# Patient Record
Sex: FEMALE | Race: WHITE | URBAN - METROPOLITAN AREA
[De-identification: names, ages, dates, MRNs, and addresses within clinical notes are randomized per-mention and may not be internally consistent; named-entity substitution may affect disease eponyms.]

---

## 2020-11-05 ENCOUNTER — APPOINTMENT (RX ONLY)
Dept: URBAN - METROPOLITAN AREA CLINIC 27 | Facility: CLINIC | Age: 85
Setting detail: DERMATOLOGY
End: 2020-11-05

## 2020-11-05 DIAGNOSIS — L82.0 INFLAMED SEBORRHEIC KERATOSIS: ICD-10-CM

## 2020-11-05 DIAGNOSIS — D22 MELANOCYTIC NEVI: ICD-10-CM

## 2020-11-05 DIAGNOSIS — D18.0 HEMANGIOMA: ICD-10-CM

## 2020-11-05 DIAGNOSIS — L81.4 OTHER MELANIN HYPERPIGMENTATION: ICD-10-CM

## 2020-11-05 DIAGNOSIS — L57.8 OTHER SKIN CHANGES DUE TO CHRONIC EXPOSURE TO NONIONIZING RADIATION: ICD-10-CM

## 2020-11-05 DIAGNOSIS — L82.1 OTHER SEBORRHEIC KERATOSIS: ICD-10-CM

## 2020-11-05 PROBLEM — D18.01 HEMANGIOMA OF SKIN AND SUBCUTANEOUS TISSUE: Status: ACTIVE | Noted: 2020-11-05

## 2020-11-05 PROBLEM — D22.5 MELANOCYTIC NEVI OF TRUNK: Status: ACTIVE | Noted: 2020-11-05

## 2020-11-05 PROCEDURE — 17110 DESTRUCTION B9 LES UP TO 14: CPT

## 2020-11-05 PROCEDURE — ? FULL BODY SKIN EXAM

## 2020-11-05 PROCEDURE — 99203 OFFICE O/P NEW LOW 30 MIN: CPT | Mod: 25

## 2020-11-05 PROCEDURE — ? COUNSELING

## 2020-11-05 PROCEDURE — ? LIQUID NITROGEN

## 2020-11-05 PROCEDURE — ? SUNSCREEN RECOMMENDATIONS

## 2020-11-05 PROCEDURE — ? ADDITIONAL NOTES

## 2020-11-05 ASSESSMENT — LOCATION DETAILED DESCRIPTION DERM
LOCATION DETAILED: LEFT SUPERIOR MEDIAL UPPER BACK
LOCATION DETAILED: RIGHT MEDIAL BREAST 3-4:00 REGION
LOCATION DETAILED: LEFT MEDIAL BREAST 10-11:00 REGION
LOCATION DETAILED: MIDDLE STERNUM
LOCATION DETAILED: LEFT INFERIOR MEDIAL UPPER BACK
LOCATION DETAILED: LEFT INFERIOR MEDIAL MIDBACK
LOCATION DETAILED: INFERIOR THORACIC SPINE
LOCATION DETAILED: RIGHT INFERIOR MEDIAL MIDBACK
LOCATION DETAILED: LEFT MEDIAL BREAST 9-10:00 REGION

## 2020-11-05 ASSESSMENT — LOCATION ZONE DERM: LOCATION ZONE: TRUNK

## 2020-11-05 ASSESSMENT — LOCATION SIMPLE DESCRIPTION DERM
LOCATION SIMPLE: LEFT UPPER BACK
LOCATION SIMPLE: LEFT LOWER BACK
LOCATION SIMPLE: UPPER BACK
LOCATION SIMPLE: CHEST
LOCATION SIMPLE: LEFT BREAST
LOCATION SIMPLE: RIGHT BREAST
LOCATION SIMPLE: RIGHT LOWER BACK

## 2020-11-05 NOTE — PROCEDURE: LIQUID NITROGEN
Include Z78.9 (Other Specified Conditions Influencing Health Status) As An Associated Diagnosis?: No
Detail Level: Detailed
Medical Necessity Clause: This procedure was medically necessary because the lesions that were treated were:
Consent: The patient's consent was obtained including but not limited to risks of crusting, scabbing, blistering, scarring, darker or lighter pigmentary change, recurrence, incomplete removal and infection.
Render Post-Care Instructions In Note?: yes
Post-Care Instructions: I reviewed with the patient in detail post-care instructions. Patient is to wear sunprotection, and avoid picking at any of the treated lesions. Pt may apply Vaseline to crusted or scabbing areas.
Medical Necessity Information: It is in your best interest to select a reason for this procedure from the list below. All of these items fulfill various CMS LCD requirements except the new and changing color options.

## 2023-05-01 ENCOUNTER — TELEPHONE (OUTPATIENT)
Dept: CARDIOTHORACIC SURGERY | Facility: CLINIC | Age: 88
End: 2023-05-01
Payer: COMMERCIAL

## 2023-05-01 NOTE — TELEPHONE ENCOUNTER
Please call patients daughterChetna, # is [phone] and complete the necessary steps to set up with care everywhere so we can obtain recent records from [facility].  Please request from [facility] on disc the most recent TTE, BEVERLY, CTA or MRI.  This patient will be coming to see dr. garcia for mitral valve disease. Thanks

## 2023-05-03 NOTE — TELEPHONE ENCOUNTER
Pt's daughter Chetna returned call said she needs clarification as to what is needed exactly for pt to be set up appt     P:    [phone]

## 2023-05-05 ENCOUNTER — TELEPHONE (OUTPATIENT)
Dept: CARDIOTHORACIC SURGERY | Facility: CLINIC | Age: 88
End: 2023-05-05
Payer: COMMERCIAL

## 2023-05-05 NOTE — TELEPHONE ENCOUNTER
Pt's daughter Chetna returned call said she needs clarification as to what is needed exactly for pt to be set up appt. Chetna states pt has been out of rhythm for a while, pt went back into rhythm on Tuesday after pt started taking amiodarone 200mg once a day     Chetna can be reached at  [phone]

## 2023-05-05 NOTE — TELEPHONE ENCOUNTER
Spoke to kate, she said she gave insurance info and other info today to someone earlier today however I need to be able to utilize care everywhere. Can you please help kate figure out how to enroll her mom into care everywhere.

## 2023-05-05 NOTE — TELEPHONE ENCOUNTER
Hey you sent a disc request on 5/2, will you let me know when we receive it please .lety goncalves

## 2023-05-09 NOTE — TELEPHONE ENCOUNTER
Sejal called stating she is sending records to Dr Tesfaye through [facility].     Please keep an eye open for them. ty

## 2023-05-16 NOTE — TELEPHONE ENCOUNTER
Danika from [facility] called.    They use [employer] not [employer].     She will send echo dated  12/2022 and BEVERLY dated 3/14 out today.    Danika can be reached at     [phone] if needed. ty

## 2023-05-23 ENCOUNTER — TELEPHONE (OUTPATIENT)
Dept: SCHEDULING | Facility: CLINIC | Age: 88
End: 2023-05-23
Payer: COMMERCIAL

## 2023-05-23 NOTE — TELEPHONE ENCOUNTER
Pt daughter calling back in regards to previus messages    Would like to know if the disc have been received and reviewed    Also still trying to schedule appt with , please assist     Chetna can be reached at [phone]

## 2023-06-16 NOTE — TELEPHONE ENCOUNTER
This pt is scheduled for 6/22/2023 in our office. Can we request copies of her discs from [facility]? Thank You!

## 2023-06-20 ENCOUNTER — TELEPHONE (OUTPATIENT)
Dept: CARDIOTHORACIC SURGERY | Facility: CLINIC | Age: 88
End: 2023-06-20

## 2023-06-20 NOTE — TELEPHONE ENCOUNTER
Hi Crystal~ I know we requested the discs from [facility] but I don't see them in PACS. Can we put in another request? May need a !

## 2023-06-22 ENCOUNTER — HOSPITAL ENCOUNTER (OUTPATIENT)
Dept: CARDIOLOGY | Facility: HOSPITAL | Age: 88
Discharge: HOME | End: 2023-06-22
Attending: STUDENT IN AN ORGANIZED HEALTH CARE EDUCATION/TRAINING PROGRAM
Payer: COMMERCIAL

## 2023-06-22 ENCOUNTER — OFFICE VISIT (OUTPATIENT)
Dept: CARDIOTHORACIC SURGERY | Facility: CLINIC | Age: 88
End: 2023-06-22
Payer: COMMERCIAL

## 2023-06-22 VITALS
BODY MASS INDEX: 25.03 KG/M2 | DIASTOLIC BLOOD PRESSURE: 74 MMHG | RESPIRATION RATE: 16 BRPM | HEIGHT: 60 IN | HEART RATE: 47 BPM | OXYGEN SATURATION: 92 % | SYSTOLIC BLOOD PRESSURE: 126 MMHG | WEIGHT: 127.5 LBS | TEMPERATURE: 97.6 F

## 2023-06-22 VITALS
OXYGEN SATURATION: 92 % | RESPIRATION RATE: 16 BRPM | HEIGHT: 60 IN | DIASTOLIC BLOOD PRESSURE: 65 MMHG | BODY MASS INDEX: 24.94 KG/M2 | SYSTOLIC BLOOD PRESSURE: 121 MMHG | WEIGHT: 127 LBS | HEART RATE: 55 BPM

## 2023-06-22 DIAGNOSIS — I34.0 NONRHEUMATIC MITRAL VALVE REGURGITATION: Primary | ICD-10-CM

## 2023-06-22 DIAGNOSIS — I34.0 NONRHEUMATIC MITRAL VALVE REGURGITATION: ICD-10-CM

## 2023-06-22 DIAGNOSIS — I25.10 CORONARY ARTERY DISEASE, UNSPECIFIED VESSEL OR LESION TYPE, UNSPECIFIED WHETHER ANGINA PRESENT, UNSPECIFIED WHETHER NATIVE OR TRANSPLANTED HEART: ICD-10-CM

## 2023-06-22 DIAGNOSIS — I50.20 HFREF (HEART FAILURE WITH REDUCED EJECTION FRACTION) (CMS/HCC): ICD-10-CM

## 2023-06-22 LAB
AORTIC ROOT ANNULUS - M-MODE: 2.8 CM
AORTIC ROOT ANNULUS: 2.9 CM
AORTIC VALVE MEAN VELOCITY: 1.07 M/S
AORTIC VALVE VELOCITY TIME INTEGRAL: 40 CM
ASCENDING AORTA: 4.5 CM
AV MEAN GRADIENT: 5 MMHG
AV PEAK GRADIENT: 9 MMHG
AV PEAK VELOCITY-S: 1.54 M/S
AV REG PEAK VEL: 4.24 M/S
AV REGURGITATION PRESSURE HALF TIME: 514 MS
AV VALVE AREA INDEX: 2.12
AV VALVE AREA: 2.69 CM2
AV VELOCITY RATIO: 0.8
AVA (VTI): 3.3 CM2
BSA FOR ECHO PROCEDURE: 1.56 M2
CUSP SEPARATION: 1.8 CM
DOP CALC LVOT STROKE VOLUME: 132.05 CM3
E WAVE DECELERATION TIME: 190 MS
E/A RATIO: 0.8
E/E' RATIO: 11.4
E/LAT E' RATIO: 11.1
EF (A4C): 34.5 %
FRACTIONAL SHORTENING: 15.74 %
HEART RATE: 55 BPM
INTERVENTRICULAR SEPTUM: 0.82 CM
LA ESV (BP): 132 CM3
LA ESV INDEX (A2C): 80.13 CM3/M2
LA ESV INDEX (BP): 84.62 CM3/M2
LA/AORTA RATIO: 1.9
LAAS-AP2: 32.7 CM2
LAAS-AP4: 31.9 CM2
LAD 2D: 5.5 CM
LALD A4C: 6.43 CM
LALD A4C: 7.13 CM
LAV-S: 125 CM3
LEFT ATRIUM VOLUME INDEX: 80.77 CM3/M2
LEFT ATRIUM VOLUME: 126 CM3
LEFT INTERNAL DIMENSION IN SYSTOLE: 4.39 CM (ref 2.26–3.42)
LEFT VENTRICLE DIASTOLIC VOLUME INDEX: 89.1 CM3/M2
LEFT VENTRICLE DIASTOLIC VOLUME: 139 CM3
LEFT VENTRICLE SYSTOLIC VOLUME INDEX: 58.33 CM3/M2
LEFT VENTRICLE SYSTOLIC VOLUME: 91 CM3
LEFT VENTRICULAR INTERNAL DIMENSION IN DIASTOLE: 5.21 CM (ref 3.8–5.27)
LEFT VENTRICULAR POSTERIOR WALL IN END DIASTOLE: 0.97 CM (ref 0.49–0.9)
LVAD-AP4: 38.6 CM2
LVAS-AP4: 28.6 CM2
LVLD-AP4: 8.69 CM
LVLS-AP4: 7.2 CM
LVOT 2D: 2.3 CM
LVOT A: 4.15 CM2
LVOT MG: 3 MMHG
LVOT MV: 0.81 M/S
LVOT PEAK VELOCITY: 1.27 M/S
LVOT PG: 6 MMHG
LVOT STROKE VOLUME INDEX: 84.65 ML/M2
LVOT VTI: 31.8 CM
MLH CV ECHO AVA INDEX VELOCITY RATIO: 1.7
MV E'TISSUE VEL-LAT: 0.04 M/S
MV E'TISSUE VEL-MED: 0.04 M/S
MV PEAK A VEL: 0.58 M/S
MV PEAK E VEL: 0.48 M/S
POSTERIOR WALL: 0.97 CM
PULM VEIN S/D RATIO: 1.6
PULMONARY REGURGITATION LATE DIASTOLIC VELOCITY: 1.31 M/S
PV PEAK D VEL: 0.35 M/S
PV PEAK S VEL: 0.55 M/S
RVOT VMAX: 0.59 M/S
RVOT VTI: 12.4 CM
SEPTAL TISSUE DOPPLER FREE WALL LATE DIA VELOCITY (APICAL 4 CHAMBER VIEW): 0.12 M/S
TAPSE: 2.07 CM
TR MAX PG: 45.16 MMHG
TRICUSPID VALVE PEAK REGURGITATION VELOCITY: 3.36 M/S
Z-SCORE OF LEFT VENTRICULAR DIMENSION IN END DIASTOLE: 1.52
Z-SCORE OF LEFT VENTRICULAR DIMENSION IN END SYSTOLE: 3.62
Z-SCORE OF LEFT VENTRICULAR POSTERIOR WALL IN END DIASTOLE: 1.99

## 2023-06-22 PROCEDURE — 25500000 HC DRUGS/INCIDENT RAD: Mod: JZ | Performed by: THORACIC SURGERY (CARDIOTHORACIC VASCULAR SURGERY)

## 2023-06-22 PROCEDURE — 99205 OFFICE O/P NEW HI 60 MIN: CPT | Performed by: THORACIC SURGERY (CARDIOTHORACIC VASCULAR SURGERY)

## 2023-06-22 PROCEDURE — C8929 TTE W OR WO FOL WCON,DOPPLER: HCPCS

## 2023-06-22 PROCEDURE — 93306 TTE W/DOPPLER COMPLETE: CPT | Mod: 26 | Performed by: INTERNAL MEDICINE

## 2023-06-22 PROCEDURE — 93000 ELECTROCARDIOGRAM COMPLETE: CPT | Performed by: THORACIC SURGERY (CARDIOTHORACIC VASCULAR SURGERY)

## 2023-06-22 PROCEDURE — 3008F BODY MASS INDEX DOCD: CPT | Performed by: THORACIC SURGERY (CARDIOTHORACIC VASCULAR SURGERY)

## 2023-06-22 PROCEDURE — 25000000 HC PHARMACY GENERAL: Performed by: THORACIC SURGERY (CARDIOTHORACIC VASCULAR SURGERY)

## 2023-06-22 RX ORDER — AMIODARONE HYDROCHLORIDE 200 MG/1
200 TABLET ORAL DAILY
COMMUNITY
Start: 2023-05-30

## 2023-06-22 RX ORDER — METOPROLOL TARTRATE 25 MG/1
25 TABLET, FILM COATED ORAL 2 TIMES DAILY
COMMUNITY
Start: 2023-04-11

## 2023-06-22 RX ORDER — UBIDECARENONE 100 MG
200 CAPSULE ORAL DAILY
COMMUNITY

## 2023-06-22 RX ORDER — ATORVASTATIN CALCIUM 20 MG/1
20 TABLET, FILM COATED ORAL DAILY
COMMUNITY
Start: 2023-05-05

## 2023-06-22 RX ORDER — LANOLIN ALCOHOL/MO/W.PET/CERES
400 CREAM (GRAM) TOPICAL DAILY
COMMUNITY

## 2023-06-22 RX ORDER — MULTIVITAMIN WITH IRON
TABLET ORAL SEE ADMIN INSTRUCTIONS
COMMUNITY

## 2023-06-22 RX ORDER — BUMETANIDE 2 MG/1
2 TABLET ORAL 2 TIMES DAILY
COMMUNITY
Start: 2023-05-26

## 2023-06-22 RX ORDER — RIVAROXABAN 20 MG/1
TABLET, FILM COATED ORAL
COMMUNITY
Start: 2023-05-19

## 2023-06-22 RX ADMIN — PERFLUTREN: 6.52 INJECTION, SUSPENSION INTRAVENOUS at 12:40

## 2023-06-22 ASSESSMENT — PATIENT HEALTH QUESTIONNAIRE - PHQ9: SUM OF ALL RESPONSES TO PHQ9 QUESTIONS 1 & 2: 0

## 2023-06-22 NOTE — PROGRESS NOTES
I, John Coyle MD, personally performed the services described in this documentation as scribed by Twyla Gonzalez PA-C in my presence, and it is both accurate and complete.    7/3/2023 12:13 PM    John Coyle MD, MS, FACS, [facility]  System Surgical Director,  Structural Heart Program  [facility]    Associate Professor of Surgery,  [facility]          [facility]          Dr. Leonides Gonzalez PA-C   [facility]                                          [phone]    Cate Lan presents today for 2nd opinion consultation of her mitral regurgitation. She is being referred by Dr. Hannon. She is accompanied today by her two daughters.     Patient is 88 y.o. female with a PMH of HTN, HLD, COPD on home O2, hepatic adenoma s/p resection, lung nodule, prior COVID with PE, IVCD, atrial fibrillation and severe mitral regurgitation.    Of note, she had 2 recent hospitalizations in past 3 months for acute HF with a concomitant COPD exacerbation. Given severe MR, she underwent workup for ROMAN therapy (mitraclip) and was deemed an appropriate candidate. She initially presented to [place] on 3/14/23 for a planned MitraClip procedure, however, preoperative BEVERLY and RHC was completed which revealed her anatomy not suitable to proceed with mitraclip therapy. Given no signs of volume overload (normal PCWP ) in light of known COPD, it was recommended she proceed with medical management and optimization of her GDMT.    At present, she continues to endorse shortness of breath at rest and with minimal activity. She remains of home O2. Her dyspnea is severely impacting her quality of life. She denies any weight gain, dizzinessm syncope, near syncope, CP, palpitations, or LE edema.     Primary Cardiologist: Dr. Hannon  PCP: Dr. Braxton    Medical History:   Past Medical History:   Diagnosis Date   • COPD (chronic obstructive  pulmonary disease) (CMS/HCC)    • Liver mass        Surgical History:   Past Surgical History:   Procedure Laterality Date   • LIVER SURGERY         Social History:   Social History     Social History Narrative   • Not on file       Family History: History reviewed. No pertinent family history.    Allergies: Patient has no known allergies.      Current Medications    Current Outpatient Medications:   •  amiodarone (PACERONE) 200 mg tablet, Take 200 mg by mouth daily., Disp: , Rfl:   •  atorvastatin (LIPITOR) 20 mg tablet, Take 20 mg by mouth daily., Disp: , Rfl:   •  bumetanide (BUMEX) 2 mg tablet, Take 2 mg by mouth 2 (two) times a day., Disp: , Rfl:   •  coenzyme Q10 (COQ10) 100 mg capsule, Take 200 mg by mouth daily., Disp: , Rfl:   •  magnesium oxide (MAG-OX) 400 mg (241.3 mg magnesium) tablet, Take 400 mg by mouth daily., Disp: , Rfl:   •  metoprolol tartrate (LOPRESSOR) 25 mg tablet, Take 25 mg by mouth 2 (two) times a day., Disp: , Rfl:   •  vit A and D3 in cod liver oiL 1,250-135 unit capsule, Take by mouth See admin instr., Disp: , Rfl:   •  VITAMIN B COMPLEX ORAL, Take 1 capsule by mouth daily., Disp: , Rfl:   •  XARELTO 20 mg tablet, take 1 tablet by mouth once daily WITH DINNER, Disp: , Rfl:      Review of Systems  Negative 10 point ROS besides what is noted in HPI.    Objective   Vitals:    06/22/23 0925   BP: 126/74   Pulse: (!) 47   Resp: 16   Temp: 36.4 °C (97.6 °F)   SpO2: 92%       Physical Exam  Constitutional:       Appearance: Normal appearance. She is normal weight.   HENT:      Head: Normocephalic and atraumatic.      Right Ear: External ear normal.      Left Ear: External ear normal.      Nose: Nose normal.      Mouth/Throat:      Mouth: Mucous membranes are moist.      Pharynx: Oropharynx is clear.   Eyes:      Extraocular Movements: Extraocular movements intact.      Conjunctiva/sclera: Conjunctivae normal.      Pupils: Pupils are equal, round, and reactive to light.   Neck:      Vascular:  No JVD.   Cardiovascular:      Rate and Rhythm: Normal rate and regular rhythm.      Pulses: Normal pulses.      Heart sounds: Murmur heard.      No friction rub. No gallop.   Pulmonary:      Breath sounds: No wheezing, rhonchi or rales.      Comments: On home oxygen  Musculoskeletal:      Right lower leg: No edema.      Left lower leg: No edema.   Skin:     General: Skin is warm and dry.      Capillary Refill: Capillary refill takes less than 2 seconds.   Neurological:      General: No focal deficit present.      Mental Status: She is alert and oriented to person, place, and time.   Psychiatric:         Mood and Affect: Mood normal.         Behavior: Behavior normal.         Labs  No results found for: WBC, HGB, HCT, MCV, PLT  No results found for: GLUCOSE, CALCIUM, NA, K, CO2, CL, BUN, CREATININE      Imaging  [id number]      TTE: 23  Interpretation Summary    Rhythm is sinus bradycardia. Technically difficult study. Echo contrast was used for endocardial enhancement.     1. Left ventricle is mildly dilated in size with normal wall thickness. Prominent basal septal hypertrophy.  Moderate-severely decreased systolic function, estimated ejection fraction 30%. Wall motion difficult to assess even with echo contrast, global hypokinesis more pronounced in the anteroseptum and septum.   2. Aortic valve is tricuspid. Aortic valve and root sclerosis.  No aortic stenosis.  Mild aortic regurgitation.   3. Aortic root is normal size. Visualized portion of ascending aorta is dilated and aneurysmal measuring 4.5 cm.  4.  Mitral valve leaflets incompletely visualized, there is marked thickening of myxomatous changes with prolapse of the anterior mitral valve leaflet.  Mitral annular calcification.  There is mild mitral regurgitation present  5. Left atrium is severely dilated.  6. Right ventricle is normal in size with preserved systolic function.   7. Right atrium is mildly dilated .   8. Mild to moderate tricuspid  regurgitation, estimated right ventricular systolic pressure 52 mmHg.  9. Mild pulmonic regurgitation.   10. Inferior vena cava is normal in size with >50% respiratory variation, consistent with estimated right atrial pressure of 3 mmHg.  11.  No pericardial effusion.     No prior echo for comparison.    BEVERLY: 3/14/23    BEVERLY 3/14 - preliminary report     Left Ventricle Normal left ventricular chamber size. Low normal left ventricular systolic function.   Left Atrium There is no thrombus present in the left atrium or in the left atrial appendage. Four pulmonary veins are seen entering the left atrium. Systolic flow reversal is present in the left superior pulmonary vein.   Mitral Valve There is severe prolapse of the anterior mitral valve leaflet. Posterior leaflet is normal in size laterally (1mm), but is small and diminuitive in size centrally and medially. Mitral regurgitatant jet occupies a large portion of mitral valves and extends from lateral to medial aspects of the mitral valve. No mitral stenosis. Severe mitral regurgitation.   Aortic Valve The aortic valve is trileaflet. Thickened aortic valve leaflets. Mild to moderate aortic regurgitation.   Right Ventricle Normal right ventricular function.   Tricuspid Valve Normal tricuspid valve. Moderate tricuspid regurgitation.   Pulmonic Valve Visualized segments of the pulmonic valve are normal. Mild pulmonic regurgitation.   Aorta There is focal grade 2 (severe intimal thickening) atheromatous plaque present in the aortic arch and descending aorta.   Pericardium Trivial pericardial effusion. The pericardial effusion is adjacent to the right atrium.     RHC: 3/14/23  RHC 3/14     Conclusion       • Preprocedure BEVERLY shows an absence of the posterior leaflet, therefore anatomy is not feasible for Mitraclip.  • RA= 11 mm Hg, RV= 35/11 mm Hg, PA= 35/16/23 mm Hg, PCWP= 16 mm Hg with V-waves to 22 mm Hg  • PA sat= 67%, AO sat= 100%, CO= 3.4, CI= 2.1, SVR= 1704, PVR=  2.1    Assessment:  #Aortic Aneurysm  Seen on recent TTE, max ascending dimension measuring 4.5cm    #Tricuspid Regurgitation  Normal RV size/function, mildly dilated RA, mild-moderate TR. RVSP 52mmHg     #HFpEF    #Mitral Regurgitation  Echo personally reviewed by John Coyle MD  Marked thickening with myxomatous changes with anterior mitral valve leaflet prolapse. Mild MR. LVEF 30% with RWMA more pronounced in the anteroseptum and septum. NYHA Class III  No history of syncopal or presyncopal episodes.    Patient has had recent hospitalization for CHF.  She has symptoms of MELENDEZ.  Medically managed Yes with bumex and lopressor.    Plan:  Her MR mild on today's TTE and is likely a dynamic process. The patient is euvolemic and there is no evidence of decompensated HF or need for intervention at this time. Her repeat TTE demonstrates a globally reduced LVEF 30% with RWMA more pronounced in the anteroseptum and septum. Advised to F/U with her primary cardiologist to have a LHC to r/o any underlying CAD and optimization of her GDMT. She will return to the clinic in 3 months with a repeat TTE.     Twyla TERAN PA-C am scribing and in the presence of  Dr. John Coyle.      John Coyle MD

## 2023-06-22 NOTE — PATIENT INSTRUCTIONS
Return to clinic in 3 months with repeat echo.    Please follow-up with Dr. Hannon to see if she needs a cardiac catheterization.    She would need cardiac clearance from Dr. Hannon before proceeding with any liver biopsies or resections.    We will request the BEVERLY disc from [facility] to review.     Call with any questions or concerns in the interim [phone]

## 2023-06-22 NOTE — LETTER
June 28, 2023     Karri Braxton, DO  [address]  [place] [id number]    Patient: Cate Lan  YOB: 1935  Date of Visit: 6/22/2023      Dear Dr. Braxton:    Thank you for referring Cate Lan to me for evaluation. Below are my notes for this consultation.    If you have questions, please do not hesitate to call me. I look forward to following your patient along with you.         Sincerely,        John Coyle MD        CC: MD Carlos Jordan Victoria Lynn, PA C  6/28/2023  2:34 PM  Sign when Signing Visit  [facility]          Dr. Leonides Gonzalez PA-C   [facility]                                          [phone]    Cate Lan presents today for 2nd opinion consultation of her mitral regurgitation. She is being referred by Dr. Hannon. She is accompanied today by her two daughters.     Patient is 88 y.o. female with a PMH of HTN, HLD, COPD on home O2, hepatic adenoma s/p resection, lung nodule, prior COVID with PE, IVCD, atrial fibrillation and severe mitral regurgitation.    Of note, she had 2 recent hospitalizations in past 3 months for acute HF with a concomitant COPD exacerbation. Given severe MR, she underwent workup for ROMAN therapy (mitraclip) and was deemed an appropriate candidate. She initially presented to [facility] on 3/14/23 for a planned MitraClip procedure, however, preoperative BEVERLY and RHC was completed which revealed her anatomy not suitable to proceed with mitraclip therapy. Given no signs of volume overload (normal PCWP ) in light of known COPD, it was recommended she proceed with medical management and optimization of her GDMT.    At present, she continues to endorse shortness of breath at rest and with minimal activity. She remains of home O2. Her dyspnea is severely impacting her quality of life. She denies any weight gain, dizzinessm syncope, near syncope, CP, palpitations, or LE edema.     Primary  Cardiologist: Dr. Hannon  PCP: Dr. Braxton    Medical History:   Past Medical History:   Diagnosis Date   • COPD (chronic obstructive pulmonary disease) (CMS/HCC)    • Liver mass        Surgical History:   Past Surgical History:   Procedure Laterality Date   • LIVER SURGERY         Social History:   Social History     Social History Narrative   • Not on file       Family History: History reviewed. No pertinent family history.    Allergies: Patient has no known allergies.      Current Medications    Current Outpatient Medications:   •  amiodarone (PACERONE) 200 mg tablet, Take 200 mg by mouth daily., Disp: , Rfl:   •  atorvastatin (LIPITOR) 20 mg tablet, Take 20 mg by mouth daily., Disp: , Rfl:   •  bumetanide (BUMEX) 2 mg tablet, Take 2 mg by mouth 2 (two) times a day., Disp: , Rfl:   •  coenzyme Q10 (COQ10) 100 mg capsule, Take 200 mg by mouth daily., Disp: , Rfl:   •  magnesium oxide (MAG-OX) 400 mg (241.3 mg magnesium) tablet, Take 400 mg by mouth daily., Disp: , Rfl:   •  metoprolol tartrate (LOPRESSOR) 25 mg tablet, Take 25 mg by mouth 2 (two) times a day., Disp: , Rfl:   •  vit A and D3 in cod liver oiL 1,250-135 unit capsule, Take by mouth See admin instr., Disp: , Rfl:   •  VITAMIN B COMPLEX ORAL, Take 1 capsule by mouth daily., Disp: , Rfl:   •  XARELTO 20 mg tablet, take 1 tablet by mouth once daily WITH DINNER, Disp: , Rfl:      Review of Systems  Negative 10 point ROS besides what is noted in HPI.    Objective   Vitals:    06/22/23 0925   BP: 126/74   Pulse: (!) 47   Resp: 16   Temp: 36.4 °C (97.6 °F)   SpO2: 92%       Physical Exam  Constitutional:       Appearance: Normal appearance. She is normal weight.   HENT:      Head: Normocephalic and atraumatic.      Right Ear: External ear normal.      Left Ear: External ear normal.      Nose: Nose normal.      Mouth/Throat:      Mouth: Mucous membranes are moist.      Pharynx: Oropharynx is clear.   Eyes:      Extraocular Movements: Extraocular movements  intact.      Conjunctiva/sclera: Conjunctivae normal.      Pupils: Pupils are equal, round, and reactive to light.   Neck:      Vascular: No JVD.   Cardiovascular:      Rate and Rhythm: Normal rate and regular rhythm.      Pulses: Normal pulses.      Heart sounds: Murmur heard.      No friction rub. No gallop.   Pulmonary:      Breath sounds: No wheezing, rhonchi or rales.      Comments: On home oxygen  Musculoskeletal:      Right lower leg: No edema.      Left lower leg: No edema.   Skin:     General: Skin is warm and dry.      Capillary Refill: Capillary refill takes less than 2 seconds.   Neurological:      General: No focal deficit present.      Mental Status: She is alert and oriented to person, place, and time.   Psychiatric:         Mood and Affect: Mood normal.         Behavior: Behavior normal.         Labs  No results found for: WBC, HGB, HCT, MCV, PLT  No results found for: GLUCOSE, CALCIUM, NA, K, CO2, CL, BUN, CREATININE      Imaging  [id number]      TTE: 23  Interpretation Summary    Rhythm is sinus bradycardia. Technically difficult study. Echo contrast was used for endocardial enhancement.     1. Left ventricle is mildly dilated in size with normal wall thickness. Prominent basal septal hypertrophy.  Moderate-severely decreased systolic function, estimated ejection fraction 30%. Wall motion difficult to assess even with echo contrast, global hypokinesis more pronounced in the anteroseptum and septum.   2. Aortic valve is tricuspid. Aortic valve and root sclerosis.  No aortic stenosis.  Mild aortic regurgitation.   3. Aortic root is normal size. Visualized portion of ascending aorta is dilated and aneurysmal measuring 4.5 cm.  4.  Mitral valve leaflets incompletely visualized, there is marked thickening of myxomatous changes with prolapse of the anterior mitral valve leaflet.  Mitral annular calcification.  There is mild mitral regurgitation present  5. Left atrium is severely dilated.  6.  Right ventricle is normal in size with preserved systolic function.   7. Right atrium is mildly dilated .   8. Mild to moderate tricuspid regurgitation, estimated right ventricular systolic pressure 52 mmHg.  9. Mild pulmonic regurgitation.   10. Inferior vena cava is normal in size with >50% respiratory variation, consistent with estimated right atrial pressure of 3 mmHg.  11.  No pericardial effusion.     No prior echo for comparison.    BEVERLY: 3/14/23    BEVERLY 3/14 - preliminary report     Left Ventricle Normal left ventricular chamber size. Low normal left ventricular systolic function.   Left Atrium There is no thrombus present in the left atrium or in the left atrial appendage. Four pulmonary veins are seen entering the left atrium. Systolic flow reversal is present in the left superior pulmonary vein.   Mitral Valve There is severe prolapse of the anterior mitral valve leaflet. Posterior leaflet is normal in size laterally (1mm), but is small and diminuitive in size centrally and medially. Mitral regurgitatant jet occupies a large portion of mitral valves and extends from lateral to medial aspects of the mitral valve. No mitral stenosis. Severe mitral regurgitation.   Aortic Valve The aortic valve is trileaflet. Thickened aortic valve leaflets. Mild to moderate aortic regurgitation.   Right Ventricle Normal right ventricular function.   Tricuspid Valve Normal tricuspid valve. Moderate tricuspid regurgitation.   Pulmonic Valve Visualized segments of the pulmonic valve are normal. Mild pulmonic regurgitation.   Aorta There is focal grade 2 (severe intimal thickening) atheromatous plaque present in the aortic arch and descending aorta.   Pericardium Trivial pericardial effusion. The pericardial effusion is adjacent to the right atrium.     RHC: 3/14/23  RHC 3/14     Conclusion       • Preprocedure BEVERLY shows an absence of the posterior leaflet, therefore anatomy is not feasible for Mitraclip.  • RA= 11 mm Hg, RV=  35/11 mm Hg, PA= 35/16/23 mm Hg, PCWP= 16 mm Hg with V-waves to 22 mm Hg  • PA sat= 67%, AO sat= 100%, CO= 3.4, CI= 2.1, SVR= 1704, PVR= 2.1    Assessment:  #Aortic Aneurysm  Seen on recent TTE, max ascending dimension measuring 4.5cm    #Tricuspid Regurgitation  Normal RV size/function, mildly dilated RA, mild-moderate TR. RVSP 52mmHg     #HFpEF    #Mitral Regurgitation  Echo personally reviewed by John Coyle MD  Marked thickening with myxomatous changes with anterior mitral valve leaflet prolapse. Mild MR. LVEF 30% with RWMA more pronounced in the anteroseptum and septum. NYHA Class III  No history of syncopal or presyncopal episodes.    Patient has had recent hospitalization for CHF.  She has symptoms of MELENDEZ.  Medically managed Yes with bumex and lopressor.    Plan:  Her MR mild on today's TTE and is likely a dynamic process. The patient is euvolemic and there is no evidence of decompensated HF or need for intervention at this time. Her repeat TTE demonstrates a globally reduced LVEF 30% with RWMA more pronounced in the anteroseptum and septum. Advised to F/U with her primary cardiologist to have a LHC to r/o any underlying CAD and optimization of her GDMT. She will return to the clinic in 3 months with a repeat TTE.     Twyla TERAN PA-C am scribing and in the presence of  Dr. John Coyle.      SUE Doran

## 2023-06-23 ENCOUNTER — DOCUMENTATION (OUTPATIENT)
Dept: CARDIOTHORACIC SURGERY | Facility: CLINIC | Age: 88
End: 2023-06-23
Payer: COMMERCIAL

## 2023-06-23 NOTE — PROGRESS NOTES
Cate Lan ([id number])  1935  6/23/2023       [facility] Cardiomyopathy Questionnaire ([facility]Q-12)    The following questions refer to your heart failure and how it may affect your life. Please read and complete the following  questions. There are no right or wrong answers. Please attila the answer that best applies to you.      1. Heart failure affects different people in different ways. Some feel shortness of breath while others feel fatigue. Please  indicate how much you are limited by heart failure (shortness of breath or fatigue) in your ability to do the following  activities over the past 2 weeks.    Activity     Extremely Limited    (1) Quite a bit Limited    (2) Moderately Limited    (3) Slightly Limited    (4) Not At All Limited    (5) Limited for other reasons or do not do the activity   (6) SCORE   A. Showering/bathing         3   B. Walking 1 heidi on level ground         2   C. Hurrying or jogging  (as if to catch a bus)         1         2. Over the past 2 weeks, how many times did you have swelling in your feet, ankles or legs when you woke up in the morning?      Every morning    (1) 3 or more times  per week but  not every day  (2) 1-2 times per week     (3) Less than once a week     (4) Never over the past 2 weeks     (5)   SCORE          4         3. Over the past 2 weeks, on average, how many times has fatigue limited your ability to do what you wanted?    All of  the time    (1) Several times  per day    (2) At least  once a day    (3) 3 or more times  per week but  not every day  (4) 1-2 times  per week    (5) Less than  once a week    (6) Never over the  past 2 weeks    (7)     SCORE            4         4. Over the past 2 weeks, on average, how many times has shortness of breath limited your ability to do what you wanted?    All of  the time    (1) Several times  per day    (2) At least  once a day    (3) 3 or more times  per week but  not every day  (4) 1-2 times  per  week    (5) Less than  once a week    (6) Never over the  past 2 weeks    (7)     SCORE            3         5. Over the past 2 weeks, on average, how many times have you been forced to sleep sitting up in a chair or with at  least 3 pillows to prop you up because of shortness of breath?      Every morning    (1) 3 or more times  per week but  not every day  (2) 1-2 times per week     (3) Less than once a week     (4) Never over the past 2 weeks     (5)     SCORE          3         6. Over the past 2 weeks, how much has your heart failure limited your enjoyment of life?    It has extremely  limited my enjoyment  of life    (1) It has limited my  enjoyment of life  quite a bit    (2) It has moderately  limited my enjoyment  of life    (3) It has slightly  limited my enjoyment  of life    (4) It has not limited  my enjoyment  of life at all    (5)     SCORE          3         7. If you had to spend the rest of your life with your heart failure the way it is right now, how would you feel about this?    Not at all  satisfied    (1) Mostly  dissatisfied    (2) Somewhat  satisfied    (3) Mostly  satisfied    (4) Completely  satisfied    (5)   SCORE          3         8. How much does your heart failure affect your lifestyle? Please indicate how your heart failure may have limited your  participation in the following activities over the past 2 weeks.        Activity     Severely Limited    (1) Limited  Quite a bit   (2) Moderately Limited    (3) Slightly Limited    (4) Did not limit at all    (5) Does not apply or did not do for other reasons  (6)     SCORE   A.  Hobbies, recreational  activities         4   B. Working or doing  household chores         4   C. Visiting family or  friends out of your  home           4     Essential Frailty Test (EFT)     The following baseline frailty exams were completed:        Frailty Item   Scoring System Patient's Score    Hemoglobin:    No results found for: HGB 10.9  Males:     >=  13g/dl = 0 points    < 13g/dl = 1 point       Females:     >= 12g/dl = 0 points    < 12g/dl = 1 point      1    Albumin:    No results found for: ALBUMIN 3.7  >=3.5 = 0 points    <3.5 = 1 points      1    5 Chair Rises  < 15 seconds = 0 points    >= 15 seconds = 1 point    Unable to complete= 2 points     1    Cognitive Impairment Test:          3-word recall   No cognitive impairment = 0 points   Cognitive Impairment = 1 point    0    CALCULATE SCORE:   •  Frailty = >=3/5 Items.   •  1-2= Passed Frailty Test   • 3-5= Failed Frailty Exam 3       _________________________________________________________________      5-Meter Walk Test- ONLY REQUIRED FOR TAVR     (Walk 1-3 are averaged, > 6 seconds= Frail, < 6 seconds= Not Frail)          ?     5M Walk 1: __12.7____s/5m   5M Walk 2: _11.9_____s/5m   5M Walk 3: _NA_____s/5m      5M Walk AVG: __12.3 ___ s/5m           Utilized walking aid? Yes. 4LNC       Notes: Pt unable to walk 3rd round.       ?

## 2023-06-28 ENCOUNTER — TELEPHONE (OUTPATIENT)
Dept: CARDIOTHORACIC SURGERY | Facility: CLINIC | Age: 88
End: 2023-06-28
Payer: COMMERCIAL

## 2023-06-28 NOTE — TELEPHONE ENCOUNTER
Called the [facility] said they are already working on it. They had spoken to Candi right before me.

## 2023-06-28 NOTE — TELEPHONE ENCOUNTER
Can we please request the BEVERLY disc from [facility]? Should've been performed back in March.    Thanks!